# Patient Record
Sex: MALE | Race: WHITE | NOT HISPANIC OR LATINO | Employment: UNEMPLOYED | URBAN - NONMETROPOLITAN AREA
[De-identification: names, ages, dates, MRNs, and addresses within clinical notes are randomized per-mention and may not be internally consistent; named-entity substitution may affect disease eponyms.]

---

## 2017-01-04 DIAGNOSIS — I10 ESSENTIAL HYPERTENSION: ICD-10-CM

## 2017-01-04 RX ORDER — IRBESARTAN 300 MG/1
300 TABLET ORAL NIGHTLY
Qty: 30 TABLET | Refills: 5 | OUTPATIENT
Start: 2017-01-04 | End: 2017-11-20

## 2017-05-19 ENCOUNTER — OFFICE VISIT (OUTPATIENT)
Dept: CARDIOLOGY | Facility: CLINIC | Age: 57
End: 2017-05-19

## 2017-05-19 VITALS
HEART RATE: 52 BPM | OXYGEN SATURATION: 98 % | WEIGHT: 183.2 LBS | DIASTOLIC BLOOD PRESSURE: 78 MMHG | HEIGHT: 68 IN | SYSTOLIC BLOOD PRESSURE: 147 MMHG | BODY MASS INDEX: 27.77 KG/M2

## 2017-05-19 DIAGNOSIS — I10 ESSENTIAL HYPERTENSION: Primary | ICD-10-CM

## 2017-05-19 DIAGNOSIS — R06.02 SOB (SHORTNESS OF BREATH) ON EXERTION: ICD-10-CM

## 2017-05-19 DIAGNOSIS — Z95.2 H/O AORTIC VALVE REPLACEMENT: ICD-10-CM

## 2017-05-19 PROCEDURE — 99213 OFFICE O/P EST LOW 20 MIN: CPT | Performed by: PHYSICIAN ASSISTANT

## 2017-05-19 PROCEDURE — 93000 ELECTROCARDIOGRAM COMPLETE: CPT | Performed by: PHYSICIAN ASSISTANT

## 2017-11-20 ENCOUNTER — OFFICE VISIT (OUTPATIENT)
Dept: CARDIOLOGY | Facility: CLINIC | Age: 57
End: 2017-11-20

## 2017-11-20 VITALS
HEART RATE: 53 BPM | BODY MASS INDEX: 26.76 KG/M2 | HEIGHT: 68 IN | DIASTOLIC BLOOD PRESSURE: 77 MMHG | SYSTOLIC BLOOD PRESSURE: 132 MMHG | WEIGHT: 176.6 LBS | OXYGEN SATURATION: 97 %

## 2017-11-20 DIAGNOSIS — Z95.2 H/O AORTIC VALVE REPLACEMENT: ICD-10-CM

## 2017-11-20 DIAGNOSIS — I38 HEART VALVE DISEASE: ICD-10-CM

## 2017-11-20 DIAGNOSIS — I10 ESSENTIAL HYPERTENSION: ICD-10-CM

## 2017-11-20 DIAGNOSIS — R06.02 SHORTNESS OF BREATH: Primary | ICD-10-CM

## 2017-11-20 PROCEDURE — 99213 OFFICE O/P EST LOW 20 MIN: CPT | Performed by: PHYSICIAN ASSISTANT

## 2017-11-20 RX ORDER — LISINOPRIL 20 MG/1
20 TABLET ORAL DAILY
COMMUNITY
Start: 2017-10-25

## 2017-11-20 NOTE — PROGRESS NOTES
Problem list     Subjective   Dillon Kaur is a 57 y.o. male     Chief Complaint   Patient presents with   • Follow-up     patient appears in office today for routine follow up    • Shortness of Breath     patient states he has SOA on exertion only; increased SOA at this time due to current dx of bronchitis   • Hypertension     patient has H/O HTN ; states B/P has been WNL with current medications    Problem List:  1. Valvular heart disease  1.1 Aortic valve replacement, 2010  1.2 Aortic aneurysm repair (possible aortic root by report) with repair during surgery as above.  2. Minor nonobstructive CAD per cath, August 2014  3. Hypertension  4. Dyslipidemia  5. Sleep Apnea      6. Tobacco Habituation       HPI  The patient present back in today for routine evaluation and follow-up.  Since last appointment here, he reports to me that he has done well.  He denies current chest pain.  He has stable dyspnea.  He has no PND orthopnea.  We had discussed consideration for decreasing carvedilol therapy at last visit because of bradycardia.  He did not do that as he felt that he was doing well enough.  He still feels that he does well at home.  Heart rates average in the 50s and he feels well there.  He denies dizziness and certainly has no syncope.  The patient has no further complaints otherwise and feels that he is very much stable.    Current Outpatient Prescriptions   Medication Sig Dispense Refill   • ARIPiprazole (ABILIFY) 10 MG tablet Take 10 mg by mouth Daily.     • carvedilol (COREG) 12.5 MG tablet Take 12.5 mg by mouth 2 (Two) Times a Day With Meals.     • escitalopram (LEXAPRO) 20 MG tablet Take 20 mg by mouth Daily.     • furosemide (LASIX) 40 MG tablet Take 40 mg by mouth Daily.     • levothyroxine (SYNTHROID, LEVOTHROID) 25 MCG tablet Take 25 mcg by mouth Daily.     • lisinopril (PRINIVIL,ZESTRIL) 20 MG tablet Take 20 mg by mouth Daily.     • mirtazapine (REMERON) 15 MG tablet Take 15 mg by mouth Every Night.      • nitroglycerin (NITROSTAT) 0.4 MG SL tablet Place 0.4 mg under the tongue Every 5 (Five) Minutes As Needed for Chest Pain.     • omeprazole (PriLOSEC) 20 MG capsule Take 20 mg by mouth Daily.     • simvastatin (ZOCOR) 20 MG tablet Take 1 tablet by mouth Every Night. 30 tablet 5   • warfarin (COUMADIN) 5 MG tablet Take 5 mg by mouth. Takes 7.5 mg on Thursdays and 5 mg all other days       No current facility-administered medications for this visit.        Morphine and related; Penicillins; and Tetanus toxoid    Past Medical History:   Diagnosis Date   • Aortic valve replaced    • CHF (congestive heart failure)    • Coronary artery disease    • Hyperlipidemia    • Hypertension        Social History     Social History   • Marital status: Single     Spouse name: N/A   • Number of children: N/A   • Years of education: N/A     Occupational History   • Not on file.     Social History Main Topics   • Smoking status: Current Every Day Smoker     Packs/day: 0.50     Years: 50.00     Types: Cigarettes   • Smokeless tobacco: Never Used   • Alcohol use Yes      Comment: socially   • Drug use: No   • Sexual activity: Defer     Other Topics Concern   • Not on file     Social History Narrative       Family History   Problem Relation Age of Onset   • Diabetes Mother    • COPD Father      black lung   • Diabetes Maternal Grandmother    • Diabetes Other        Review of Systems   Constitutional: Negative.  Negative for fatigue.   HENT: Positive for congestion (chest congestion due to current bronchitis) and rhinorrhea (due to URI with bronchitis). Negative for sneezing and sore throat.    Eyes: Negative.  Negative for visual disturbance.   Respiratory: Positive for apnea (JJ but cannot tolerate machine), cough (productive cough due to current dx bronchitis) and shortness of breath (SOA on exertion only; increase in SOA at this time due to bronchitis). Negative for chest tightness and wheezing.    Cardiovascular: Negative.   "Negative for chest pain (denies CP), palpitations (denies palpitations) and leg swelling.   Gastrointestinal: Negative.  Negative for abdominal distention, abdominal pain, nausea and vomiting.   Endocrine: Negative.  Negative for cold intolerance, heat intolerance, polyphagia and polyuria.   Genitourinary: Negative.  Negative for difficulty urinating, frequency and urgency.   Musculoskeletal: Positive for arthralgias (hands). Negative for back pain, myalgias, neck pain and neck stiffness.   Skin: Negative.  Negative for rash and wound.   Allergic/Immunologic: Negative.  Negative for environmental allergies and food allergies.   Neurological: Negative.  Negative for dizziness, weakness, light-headedness and headaches.   Hematological: Bruises/bleeds easily (bruises and bleeds easily).   Psychiatric/Behavioral: Negative for agitation, confusion and sleep disturbance (denies waking up smothering/SOA). The patient is not nervous/anxious.        Objective   /77 (BP Location: Left arm, Patient Position: Sitting)  Pulse 53  Ht 68\" (172.7 cm)  Wt 176 lb 9.6 oz (80.1 kg)  SpO2 97%  BMI 26.85 kg/m2  Lab Results (most recent)     None        Physical Exam   Constitutional: He is oriented to person, place, and time. He appears well-developed and well-nourished. No distress.   HENT:   Head: Normocephalic and atraumatic.   Eyes: Conjunctivae and EOM are normal. Pupils are equal, round, and reactive to light.   Neck: Normal range of motion. Neck supple. No JVD present. Carotid bruit is present (Transmitted murmur bilat). No tracheal deviation present.   Cardiovascular: Normal rate, regular rhythm and intact distal pulses.    Murmur heard.   Systolic (Second RICS to LLSB) murmur is present with a grade of 2/6   AV replacement noted.     Pulmonary/Chest: Effort normal. He has decreased breath sounds. He has no wheezes. He has no rhonchi. He has rales.   Abdominal: Soft. Bowel sounds are normal. He exhibits no distension " and no mass. There is no tenderness. There is no rebound and no guarding.   Musculoskeletal: Normal range of motion. He exhibits edema. He exhibits no tenderness or deformity.   Neurological: He is alert and oriented to person, place, and time.   Skin: Skin is warm and dry. No rash noted. No erythema. No pallor.   Psychiatric: He has a normal mood and affect. His behavior is normal. Judgment and thought content normal.   Nursing note and vitals reviewed.        Procedure   Procedures       Assessment/Plan      Diagnosis Plan   1. Shortness of breath     2. Heart valve disease     3. H/O aortic valve replacement     4. Essential hypertension       I would like to repeat labs, as above.  At this time, the patient seems to be doing well.  He denies symptoms of angina, failure, or dysrhythmias.  We will see him back in 6 months.  We likely will repeat an echo at that time to evaluate aortic valve replacement.  If there are any issues prior to that, the patient will call.  Nothing further for now.